# Patient Record
Sex: FEMALE | Race: OTHER | NOT HISPANIC OR LATINO | URBAN - METROPOLITAN AREA
[De-identification: names, ages, dates, MRNs, and addresses within clinical notes are randomized per-mention and may not be internally consistent; named-entity substitution may affect disease eponyms.]

---

## 2021-10-27 ENCOUNTER — INPATIENT (INPATIENT)
Facility: HOSPITAL | Age: 26
LOS: 1 days | Discharge: HOME | End: 2021-10-29
Attending: OBSTETRICS & GYNECOLOGY | Admitting: OBSTETRICS & GYNECOLOGY
Payer: MEDICAID

## 2021-10-27 VITALS
SYSTOLIC BLOOD PRESSURE: 115 MMHG | DIASTOLIC BLOOD PRESSURE: 74 MMHG | HEART RATE: 118 BPM | HEIGHT: 60 IN | WEIGHT: 147.05 LBS | RESPIRATION RATE: 20 BRPM | TEMPERATURE: 99 F

## 2021-10-27 DIAGNOSIS — Z98.890 OTHER SPECIFIED POSTPROCEDURAL STATES: Chronic | ICD-10-CM

## 2021-10-27 LAB
AMPHET UR-MCNC: NEGATIVE — SIGNIFICANT CHANGE UP
APPEARANCE UR: CLEAR — SIGNIFICANT CHANGE UP
BACTERIA # UR AUTO: NEGATIVE — SIGNIFICANT CHANGE UP
BARBITURATES UR SCN-MCNC: NEGATIVE — SIGNIFICANT CHANGE UP
BASOPHILS # BLD AUTO: 0.05 K/UL — SIGNIFICANT CHANGE UP (ref 0–0.2)
BASOPHILS NFR BLD AUTO: 0.2 % — SIGNIFICANT CHANGE UP (ref 0–1)
BENZODIAZ UR-MCNC: NEGATIVE — SIGNIFICANT CHANGE UP
BILIRUB UR-MCNC: NEGATIVE — SIGNIFICANT CHANGE UP
BLD GP AB SCN SERPL QL: SIGNIFICANT CHANGE UP
BUPRENORPHINE SCREEN, URINE RESULT: NEGATIVE — SIGNIFICANT CHANGE UP
COCAINE METAB.OTHER UR-MCNC: NEGATIVE — SIGNIFICANT CHANGE UP
COLOR SPEC: YELLOW — SIGNIFICANT CHANGE UP
DIFF PNL FLD: NEGATIVE — SIGNIFICANT CHANGE UP
EOSINOPHIL # BLD AUTO: 0.1 K/UL — SIGNIFICANT CHANGE UP (ref 0–0.7)
EOSINOPHIL NFR BLD AUTO: 0.5 % — SIGNIFICANT CHANGE UP (ref 0–8)
EPI CELLS # UR: 1 /HPF — SIGNIFICANT CHANGE UP (ref 0–5)
FENTANYL UR QL: NEGATIVE — SIGNIFICANT CHANGE UP
GLUCOSE UR QL: NEGATIVE — SIGNIFICANT CHANGE UP
HCT VFR BLD CALC: 42.7 % — SIGNIFICANT CHANGE UP (ref 37–47)
HGB BLD-MCNC: 14.5 G/DL — SIGNIFICANT CHANGE UP (ref 12–16)
HIV 1 & 2 AB SERPL IA.RAPID: SIGNIFICANT CHANGE UP
HYALINE CASTS # UR AUTO: 1 /LPF — SIGNIFICANT CHANGE UP (ref 0–7)
IMM GRANULOCYTES NFR BLD AUTO: 0.9 % — HIGH (ref 0.1–0.3)
KETONES UR-MCNC: ABNORMAL
L&D DRUG SCREEN, URINE: SIGNIFICANT CHANGE UP
LEUKOCYTE ESTERASE UR-ACNC: NEGATIVE — SIGNIFICANT CHANGE UP
LYMPHOCYTES # BLD AUTO: 1.64 K/UL — SIGNIFICANT CHANGE UP (ref 1.2–3.4)
LYMPHOCYTES # BLD AUTO: 7.8 % — LOW (ref 20.5–51.1)
MCHC RBC-ENTMCNC: 32.7 PG — HIGH (ref 27–31)
MCHC RBC-ENTMCNC: 34 G/DL — SIGNIFICANT CHANGE UP (ref 32–37)
MCV RBC AUTO: 96.4 FL — SIGNIFICANT CHANGE UP (ref 81–99)
METHADONE UR-MCNC: NEGATIVE — SIGNIFICANT CHANGE UP
MONOCYTES # BLD AUTO: 1.47 K/UL — HIGH (ref 0.1–0.6)
MONOCYTES NFR BLD AUTO: 7 % — SIGNIFICANT CHANGE UP (ref 1.7–9.3)
NEUTROPHILS # BLD AUTO: 17.63 K/UL — HIGH (ref 1.4–6.5)
NEUTROPHILS NFR BLD AUTO: 83.6 % — HIGH (ref 42.2–75.2)
NITRITE UR-MCNC: NEGATIVE — SIGNIFICANT CHANGE UP
NRBC # BLD: 0 /100 WBCS — SIGNIFICANT CHANGE UP (ref 0–0)
OPIATES UR-MCNC: NEGATIVE — SIGNIFICANT CHANGE UP
OXYCODONE UR-MCNC: NEGATIVE — SIGNIFICANT CHANGE UP
PCP UR-MCNC: NEGATIVE — SIGNIFICANT CHANGE UP
PH UR: 6.5 — SIGNIFICANT CHANGE UP (ref 5–8)
PLATELET # BLD AUTO: 212 K/UL — SIGNIFICANT CHANGE UP (ref 130–400)
PRENATAL SYPHILIS TEST: SIGNIFICANT CHANGE UP
PROPOXYPHENE QUALITATIVE URINE RESULT: NEGATIVE — SIGNIFICANT CHANGE UP
PROT UR-MCNC: ABNORMAL
RBC # BLD: 4.43 M/UL — SIGNIFICANT CHANGE UP (ref 4.2–5.4)
RBC # FLD: 13.4 % — SIGNIFICANT CHANGE UP (ref 11.5–14.5)
RBC CASTS # UR COMP ASSIST: 4 /HPF — SIGNIFICANT CHANGE UP (ref 0–4)
SARS-COV-2 RNA SPEC QL NAA+PROBE: SIGNIFICANT CHANGE UP
SP GR SPEC: 1.03 — SIGNIFICANT CHANGE UP (ref 1.01–1.03)
UROBILINOGEN FLD QL: SIGNIFICANT CHANGE UP
WBC # BLD: 21.08 K/UL — HIGH (ref 4.8–10.8)
WBC # FLD AUTO: 21.08 K/UL — HIGH (ref 4.8–10.8)
WBC UR QL: 2 /HPF — SIGNIFICANT CHANGE UP (ref 0–5)

## 2021-10-27 RX ORDER — IBUPROFEN 200 MG
600 TABLET ORAL EVERY 6 HOURS
Refills: 0 | Status: DISCONTINUED | OUTPATIENT
Start: 2021-10-27 | End: 2021-10-29

## 2021-10-27 RX ORDER — BENZOCAINE 10 %
1 GEL (GRAM) MUCOUS MEMBRANE EVERY 6 HOURS
Refills: 0 | Status: DISCONTINUED | OUTPATIENT
Start: 2021-10-27 | End: 2021-10-29

## 2021-10-27 RX ORDER — TETANUS TOXOID, REDUCED DIPHTHERIA TOXOID AND ACELLULAR PERTUSSIS VACCINE, ADSORBED 5; 2.5; 8; 8; 2.5 [IU]/.5ML; [IU]/.5ML; UG/.5ML; UG/.5ML; UG/.5ML
0.5 SUSPENSION INTRAMUSCULAR ONCE
Refills: 0 | Status: DISCONTINUED | OUTPATIENT
Start: 2021-10-27 | End: 2021-10-29

## 2021-10-27 RX ORDER — IBUPROFEN 200 MG
600 TABLET ORAL EVERY 6 HOURS
Refills: 0 | Status: COMPLETED | OUTPATIENT
Start: 2021-10-27 | End: 2022-09-25

## 2021-10-27 RX ORDER — FENTANYL/BUPIVACAINE/NS/PF 2MCG/ML-.1
250 PLASTIC BAG, INJECTION (ML) INJECTION
Refills: 0 | Status: DISCONTINUED | OUTPATIENT
Start: 2021-10-27 | End: 2021-10-27

## 2021-10-27 RX ORDER — ONDANSETRON 8 MG/1
4 TABLET, FILM COATED ORAL EVERY 6 HOURS
Refills: 0 | Status: DISCONTINUED | OUTPATIENT
Start: 2021-10-27 | End: 2021-10-27

## 2021-10-27 RX ORDER — PRAMOXINE HYDROCHLORIDE 150 MG/15G
1 AEROSOL, FOAM RECTAL EVERY 4 HOURS
Refills: 0 | Status: DISCONTINUED | OUTPATIENT
Start: 2021-10-27 | End: 2021-10-29

## 2021-10-27 RX ORDER — OXYTOCIN 10 UNIT/ML
333.33 VIAL (ML) INJECTION
Qty: 20 | Refills: 0 | Status: DISCONTINUED | OUTPATIENT
Start: 2021-10-27 | End: 2021-10-29

## 2021-10-27 RX ORDER — DEXAMETHASONE 0.5 MG/5ML
4 ELIXIR ORAL EVERY 6 HOURS
Refills: 0 | Status: DISCONTINUED | OUTPATIENT
Start: 2021-10-27 | End: 2021-10-27

## 2021-10-27 RX ORDER — SODIUM CHLORIDE 9 MG/ML
1000 INJECTION, SOLUTION INTRAVENOUS
Refills: 0 | Status: DISCONTINUED | OUTPATIENT
Start: 2021-10-27 | End: 2021-10-27

## 2021-10-27 RX ORDER — ACETAMINOPHEN 500 MG
975 TABLET ORAL
Refills: 0 | Status: DISCONTINUED | OUTPATIENT
Start: 2021-10-27 | End: 2021-10-29

## 2021-10-27 RX ORDER — NALOXONE HYDROCHLORIDE 4 MG/.1ML
0.1 SPRAY NASAL
Refills: 0 | Status: DISCONTINUED | OUTPATIENT
Start: 2021-10-27 | End: 2021-10-27

## 2021-10-27 RX ORDER — MAGNESIUM HYDROXIDE 400 MG/1
30 TABLET, CHEWABLE ORAL
Refills: 0 | Status: DISCONTINUED | OUTPATIENT
Start: 2021-10-27 | End: 2021-10-29

## 2021-10-27 RX ORDER — LANOLIN
1 OINTMENT (GRAM) TOPICAL EVERY 6 HOURS
Refills: 0 | Status: DISCONTINUED | OUTPATIENT
Start: 2021-10-27 | End: 2021-10-29

## 2021-10-27 RX ORDER — OXYTOCIN 10 UNIT/ML
333.33 VIAL (ML) INJECTION
Qty: 20 | Refills: 0 | Status: DISCONTINUED | OUTPATIENT
Start: 2021-10-27 | End: 2021-10-27

## 2021-10-27 RX ORDER — AER TRAVELER 0.5 G/1
1 SOLUTION RECTAL; TOPICAL EVERY 4 HOURS
Refills: 0 | Status: DISCONTINUED | OUTPATIENT
Start: 2021-10-27 | End: 2021-10-29

## 2021-10-27 RX ORDER — HYDROCORTISONE 1 %
1 OINTMENT (GRAM) TOPICAL EVERY 6 HOURS
Refills: 0 | Status: DISCONTINUED | OUTPATIENT
Start: 2021-10-27 | End: 2021-10-29

## 2021-10-27 RX ORDER — SODIUM CHLORIDE 9 MG/ML
3 INJECTION INTRAMUSCULAR; INTRAVENOUS; SUBCUTANEOUS EVERY 8 HOURS
Refills: 0 | Status: DISCONTINUED | OUTPATIENT
Start: 2021-10-27 | End: 2021-10-29

## 2021-10-27 RX ORDER — OXYTOCIN 10 UNIT/ML
2 VIAL (ML) INJECTION
Qty: 30 | Refills: 0 | Status: DISCONTINUED | OUTPATIENT
Start: 2021-10-27 | End: 2021-10-27

## 2021-10-27 RX ORDER — OXYCODONE HYDROCHLORIDE 5 MG/1
5 TABLET ORAL ONCE
Refills: 0 | Status: DISCONTINUED | OUTPATIENT
Start: 2021-10-27 | End: 2021-10-29

## 2021-10-27 RX ORDER — DIBUCAINE 1 %
1 OINTMENT (GRAM) RECTAL EVERY 6 HOURS
Refills: 0 | Status: DISCONTINUED | OUTPATIENT
Start: 2021-10-27 | End: 2021-10-29

## 2021-10-27 RX ORDER — SIMETHICONE 80 MG/1
80 TABLET, CHEWABLE ORAL EVERY 4 HOURS
Refills: 0 | Status: DISCONTINUED | OUTPATIENT
Start: 2021-10-27 | End: 2021-10-29

## 2021-10-27 RX ORDER — KETOROLAC TROMETHAMINE 30 MG/ML
30 SYRINGE (ML) INJECTION ONCE
Refills: 0 | Status: DISCONTINUED | OUTPATIENT
Start: 2021-10-27 | End: 2021-10-27

## 2021-10-27 RX ORDER — OXYCODONE HYDROCHLORIDE 5 MG/1
5 TABLET ORAL
Refills: 0 | Status: DISCONTINUED | OUTPATIENT
Start: 2021-10-27 | End: 2021-10-29

## 2021-10-27 RX ORDER — CITRIC ACID/SODIUM CITRATE 300-500 MG
15 SOLUTION, ORAL ORAL EVERY 6 HOURS
Refills: 0 | Status: DISCONTINUED | OUTPATIENT
Start: 2021-10-27 | End: 2021-10-27

## 2021-10-27 RX ORDER — DIPHENHYDRAMINE HCL 50 MG
25 CAPSULE ORAL EVERY 6 HOURS
Refills: 0 | Status: DISCONTINUED | OUTPATIENT
Start: 2021-10-27 | End: 2021-10-29

## 2021-10-27 RX ADMIN — SODIUM CHLORIDE 3 MILLILITER(S): 9 INJECTION INTRAMUSCULAR; INTRAVENOUS; SUBCUTANEOUS at 23:22

## 2021-10-27 RX ADMIN — SODIUM CHLORIDE 125 MILLILITER(S): 9 INJECTION, SOLUTION INTRAVENOUS at 18:10

## 2021-10-27 RX ADMIN — Medication 1000 MILLIUNIT(S)/MIN: at 20:53

## 2021-10-27 RX ADMIN — Medication 30 MILLIGRAM(S): at 20:53

## 2021-10-27 NOTE — CHART NOTE - NSCHARTNOTEFT_GEN_A_CORE
patient seen at bedside for recurrent late decelerations. Patient repositioned from left lateral position to right lateral position. Patient was also given a 500cc bolus. Patient has no complaints at this time. Will continue to monitor.    Vital Signs Last 24 Hrs  T(C): 37.2 (27 Oct 2021 15:22), Max: 37.4 (27 Oct 2021 13:42)  T(F): 98.96 (27 Oct 2021 15:22), Max: 99.32 (27 Oct 2021 13:42)  HR: 117 (27 Oct 2021 17:18) (86 - 121)  BP: 93/54 (27 Oct 2021 17:03) (87/54 - 126/59)  RR: 20 (27 Oct 2021 12:05) (20 - 20)  SpO2: 97% (27 Oct 2021 14:33) (97% - 97%)    Dr. Hunter aware. Dr. Mckee to be made aware.

## 2021-10-27 NOTE — OB RN DELIVERY SUMMARY - NSSELHIDDEN_OBGYN_ALL_OB_FT
[NS_DeliveryAttending1_OBGYN_ALL_OB_FT:[id number]=],[NS_DeliveryAssist1_OBGYN_ALL_OB_FT:[id number]=]

## 2021-10-27 NOTE — PROGRESS NOTE ADULT - ASSESSMENT
26y  at 40w, GBS unknown, in labor.    -pain management prn, epidural in place  -cont efm/toco  -f/u pending labs  -cont to monitor vitals  -cont iv hydration      Will make Dr. Hunter and Dr. Mckee to be made aware.

## 2021-10-27 NOTE — OB PROVIDER H&P - HISTORY OF PRESENT ILLNESS
27yo  at 40w0d GA EDC 10/27/21 patient unsure how due date was given. Patient states she had leakage of fluid since 930 this morning clear with contractions every 3minutes. Denies VB. Good fetal movement. Patient was a late transfer, started receiving prenatal care in [place] at 34 weeks. Patient is Rh negative did not receive any rhogam. GBS neg

## 2021-10-27 NOTE — OB PROVIDER DELIVERY SUMMARY - NSPROVIDERDELIVERYNOTE_OBGYN_ALL_OB_FT
Patient was fully dilated and pushing. Fetal head was OA and restituted to ROT. The anterior and posterior shoulders delivered, followed by the remaining body atraumatically. The  was handed to the mother and to the pediatric team. Delayed cord clamping was performed, and then clamped and cut. Cord blood gases collected x2. The placenta delivered intact with membranes. Pitocin was administered. Uterus massaged, fundus found to be firm. Cervix, vagina and perineum inspected 2nd degree and 1st degree laceration was noted, repaired using 3.0 chromic in the usual fashion with good hemostasis.     Viable male infant delivered, with a short cord.       present for the delivery

## 2021-10-27 NOTE — PROCEDURE NOTE - NSINFORMCONSENT_GEN_A_CORE
Hasham/Benefits, risks, and possible complications of procedure explained to patient/caregiver who verbalized understanding and gave written consent.

## 2021-10-27 NOTE — OB PROVIDER H&P - ASSESSMENT
27yo  at 40w0d GA, Rh neg, no rhogam, GBS neg, SROM 10/27 @930,   -admit to L&D  -continuous EFM and toco  -vitals and labs  -pain management PRN  -IVF hydration  -f/u covid  -f/u prenatal labs       Dr. Mckee and Dr. Hunter to be made aware

## 2021-10-27 NOTE — OB PROVIDER DELIVERY SUMMARY - NSSELHIDDEN_OBGYN_ALL_OB_FT
[NS_DeliveryAttending1_OBGYN_ALL_OB_FT:[id number]=],[NS_DeliveryAssist1_OBGYN_ALL_OB_FT:[id number]=]

## 2021-10-27 NOTE — CHART NOTE - NSCHARTNOTEFT_GEN_A_CORE
patient evaluated at bedside. Found to be 8/90/0. Repositioned to high fowlers. Will continue to monitor.

## 2021-10-27 NOTE — OB PROVIDER H&P - NSHPPHYSICALEXAM_GEN_ALL_CORE
T(C): 37 (10-27-21 @ 12:05), Max: 37 (10-27-21 @ 12:05)  HR: 118 (10-27-21 @ 12:05) (118 - 118)  BP: 115/74 (10-27-21 @ 12:05) (115/74 - 115/74)  RR: 20 (10-27-21 @ 12:05) (20 - 20)  BMI (kg/m2): 28.7 (10-27-21 @ 12:05)    Gen: A+OX3. NAD  Abd: Soft, Nontender. Gravid. palpable contractions  FHR: 155bpm/moderate variability/no accelerations/ occaisional variable decelerations  TOCO: q3mins  SVE: 3/90/0 vtx grossly ruptured    EFW by Leopolds: 3600gms

## 2021-10-28 LAB
BASOPHILS # BLD AUTO: 0.04 K/UL — SIGNIFICANT CHANGE UP (ref 0–0.2)
BASOPHILS NFR BLD AUTO: 0.2 % — SIGNIFICANT CHANGE UP (ref 0–1)
EOSINOPHIL # BLD AUTO: 0.11 K/UL — SIGNIFICANT CHANGE UP (ref 0–0.7)
EOSINOPHIL NFR BLD AUTO: 0.4 % — SIGNIFICANT CHANGE UP (ref 0–8)
HBV SURFACE AG SERPL QL IA: SIGNIFICANT CHANGE UP
HCT VFR BLD CALC: 33.9 % — LOW (ref 37–47)
HGB BLD-MCNC: 11.6 G/DL — LOW (ref 12–16)
IMM GRANULOCYTES NFR BLD AUTO: 1.1 % — HIGH (ref 0.1–0.3)
LYMPHOCYTES # BLD AUTO: 2.31 K/UL — SIGNIFICANT CHANGE UP (ref 1.2–3.4)
LYMPHOCYTES # BLD AUTO: 9.4 % — LOW (ref 20.5–51.1)
MCHC RBC-ENTMCNC: 33.6 PG — HIGH (ref 27–31)
MCHC RBC-ENTMCNC: 34.2 G/DL — SIGNIFICANT CHANGE UP (ref 32–37)
MCV RBC AUTO: 98.3 FL — SIGNIFICANT CHANGE UP (ref 81–99)
MONOCYTES # BLD AUTO: 1.75 K/UL — HIGH (ref 0.1–0.6)
MONOCYTES NFR BLD AUTO: 7.1 % — SIGNIFICANT CHANGE UP (ref 1.7–9.3)
NEUTROPHILS # BLD AUTO: 20.18 K/UL — HIGH (ref 1.4–6.5)
NEUTROPHILS NFR BLD AUTO: 81.8 % — HIGH (ref 42.2–75.2)
NRBC # BLD: 0 /100 WBCS — SIGNIFICANT CHANGE UP (ref 0–0)
PLATELET # BLD AUTO: 200 K/UL — SIGNIFICANT CHANGE UP (ref 130–400)
RBC # BLD: 3.45 M/UL — LOW (ref 4.2–5.4)
RBC # FLD: 14 % — SIGNIFICANT CHANGE UP (ref 11.5–14.5)
RUBV IGG SER-ACNC: 14.4 INDEX — SIGNIFICANT CHANGE UP
RUBV IGG SER-IMP: POSITIVE — SIGNIFICANT CHANGE UP
WBC # BLD: 24.66 K/UL — HIGH (ref 4.8–10.8)
WBC # FLD AUTO: 24.66 K/UL — HIGH (ref 4.8–10.8)

## 2021-10-28 PROCEDURE — 99231 SBSQ HOSP IP/OBS SF/LOW 25: CPT

## 2021-10-28 PROCEDURE — 93010 ELECTROCARDIOGRAM REPORT: CPT

## 2021-10-28 RX ADMIN — SODIUM CHLORIDE 3 MILLILITER(S): 9 INJECTION INTRAMUSCULAR; INTRAVENOUS; SUBCUTANEOUS at 06:01

## 2021-10-28 RX ADMIN — SODIUM CHLORIDE 3 MILLILITER(S): 9 INJECTION INTRAMUSCULAR; INTRAVENOUS; SUBCUTANEOUS at 22:24

## 2021-10-28 RX ADMIN — Medication 600 MILLIGRAM(S): at 06:03

## 2021-10-28 RX ADMIN — Medication 600 MILLIGRAM(S): at 06:36

## 2021-10-28 RX ADMIN — SODIUM CHLORIDE 3 MILLILITER(S): 9 INJECTION INTRAMUSCULAR; INTRAVENOUS; SUBCUTANEOUS at 15:23

## 2021-10-28 RX ADMIN — Medication 1 TABLET(S): at 11:50

## 2021-10-28 NOTE — PROGRESS NOTE ADULT - ASSESSMENT
26y P1 S/P  PPD 1, s/p RML episiotomy, recovering well.    - encourage ambulation  - PO hydration  - Continue diet as tolerated   - Monitor vitals and bleeding  - f/u AM CBC   - dermoplast, dubucaine cream and ice packs for pain  - motrin/tylenol PRN  - anticipate d/c home tomorrow and is instructed to follow up in 6 weeks.     Dr. Hunter and Dr. Mckee to be made aware.

## 2021-10-28 NOTE — DISCHARGE NOTE OB - CARE PLAN
Assessment and plan of treatment:	please follow up with your doctor in 6 weeks   1 Principal Discharge DX:	Vaginal delivery  Assessment and plan of treatment:	please follow up with your doctor in 6 weeks

## 2021-10-28 NOTE — DISCHARGE NOTE OB - CARE PROVIDER_API CALL
Josué Mckee  Obstetrics & Gynecology  [address] [place] [address]  Phone: [phone]  Fax: [phone]  Follow Up Time:

## 2021-10-28 NOTE — DISCHARGE NOTE OB - PATIENT PORTAL LINK FT
You can access the FollowMyHealth Patient Portal offered by [facility] by registering at the following website: [email or web address]. By joining [employer]’s FollowMyHealth portal, you will also be able to view your health information using other applications (apps) compatible with our system.

## 2021-10-28 NOTE — DISCHARGE NOTE OB - CARE PROVIDERS DIRECT ADDRESSES
[email or web address]

## 2021-10-29 VITALS
TEMPERATURE: 96 F | RESPIRATION RATE: 18 BRPM | HEART RATE: 101 BPM | DIASTOLIC BLOOD PRESSURE: 54 MMHG | SYSTOLIC BLOOD PRESSURE: 100 MMHG

## 2021-10-29 LAB
BASOPHILS # BLD AUTO: 0.07 K/UL — SIGNIFICANT CHANGE UP (ref 0–0.2)
BASOPHILS NFR BLD AUTO: 0.3 % — SIGNIFICANT CHANGE UP (ref 0–1)
EOSINOPHIL # BLD AUTO: 0.23 K/UL — SIGNIFICANT CHANGE UP (ref 0–0.7)
EOSINOPHIL NFR BLD AUTO: 1 % — SIGNIFICANT CHANGE UP (ref 0–8)
HCT VFR BLD CALC: 34.3 % — LOW (ref 37–47)
HGB BLD-MCNC: 11.4 G/DL — LOW (ref 12–16)
IMM GRANULOCYTES NFR BLD AUTO: 1 % — HIGH (ref 0.1–0.3)
LYMPHOCYTES # BLD AUTO: 11.2 % — LOW (ref 20.5–51.1)
LYMPHOCYTES # BLD AUTO: 2.51 K/UL — SIGNIFICANT CHANGE UP (ref 1.2–3.4)
MCHC RBC-ENTMCNC: 33.1 PG — HIGH (ref 27–31)
MCHC RBC-ENTMCNC: 33.2 G/DL — SIGNIFICANT CHANGE UP (ref 32–37)
MCV RBC AUTO: 99.7 FL — HIGH (ref 81–99)
MONOCYTES # BLD AUTO: 1.43 K/UL — HIGH (ref 0.1–0.6)
MONOCYTES NFR BLD AUTO: 6.4 % — SIGNIFICANT CHANGE UP (ref 1.7–9.3)
NEUTROPHILS # BLD AUTO: 17.97 K/UL — HIGH (ref 1.4–6.5)
NEUTROPHILS NFR BLD AUTO: 80.1 % — HIGH (ref 42.2–75.2)
NRBC # BLD: 0 /100 WBCS — SIGNIFICANT CHANGE UP (ref 0–0)
PLATELET # BLD AUTO: 206 K/UL — SIGNIFICANT CHANGE UP (ref 130–400)
RBC # BLD: 3.44 M/UL — LOW (ref 4.2–5.4)
RBC # FLD: 13.8 % — SIGNIFICANT CHANGE UP (ref 11.5–14.5)
WBC # BLD: 22.43 K/UL — HIGH (ref 4.8–10.8)
WBC # FLD AUTO: 22.43 K/UL — HIGH (ref 4.8–10.8)

## 2021-10-29 PROCEDURE — 99238 HOSP IP/OBS DSCHRG MGMT 30/<: CPT

## 2021-10-29 RX ORDER — IBUPROFEN 200 MG
1 TABLET ORAL
Qty: 0 | Refills: 0 | DISCHARGE
Start: 2021-10-29

## 2021-10-29 RX ORDER — ACETAMINOPHEN 500 MG
3 TABLET ORAL
Qty: 0 | Refills: 0 | DISCHARGE
Start: 2021-10-29

## 2021-10-29 RX ADMIN — Medication 600 MILLIGRAM(S): at 06:18

## 2021-10-29 RX ADMIN — Medication 1 TABLET(S): at 11:18

## 2021-10-29 NOTE — PROGRESS NOTE ADULT - SUBJECTIVE AND OBJECTIVE BOX
Chief Complaint: Postpartum    HPI: Pt doing well, pain well controlled. No overnight events, no acute complaints. Patient is tolerating PO and ambulating. Denies fevers, chills, SOB, CP, abdominal pain or heavy vaginal bleeding. She is bottle feeding.    ROS: Denies cardiovascular or respiratory symptoms    PAST MEDICAL & SURGICAL HISTORY:  No pertinent past medical history    H/O dilation and curettage        Physical Exam  Vital Signs Last 24 Hrs  T(F): 98.7 (28 Oct 2021 23:50), Max: 98.7 (28 Oct 2021 23:50)  HR: 91 (28 Oct 2021 23:50) (90 - 94)  BP: 98/53 (28 Oct 2021 23:50) (94/48 - 98/53)  RR: 18 (28 Oct 2021 23:50) (18 - 19)    Physical exam:  General - AAOx3, NAD  Heart - S1S2, RRR  Lungs - CTA BL  Abdomen:  - Soft, nontender, nondistended, BS+  - Fundus firm, nontender, below the umbilicus  Pelvis/Vagina - Normal Lochia  Extremities - No calf tenderness, no swelling    Labs:                        11.4   22.43 )-----------( 206      ( 29 Oct 2021 05:09 )             34.3                         11.6   24.66 )-----------( 200      ( 28 Oct 2021 11:55 )             33.9     ABO RH Interpretation: B NEG (10-27-21 @ 13:35)  Antibody Screen: NEG (10-27-21 @ 13:35)    
Chief Complaint: Postpartum    HPI: Pt doing well. No overnight events. Patient is complaining about pain around site of RML episiotomy. Denies swelling, redness or discharge. She is tolerating diet without difficulty. She denies fevers, CP, SOB, abdominal pain or heavy vaginal bleeding.     ROS: Denies cardiovascular or respiratory symptoms    PAST MEDICAL & SURGICAL HISTORY:  No pertinent past medical history    H/O dilation and curettage      Physical Exam  Vital Signs Last 24 Hrs  T(F): 98.4 (27 Oct 2021 22:30), Max: 99.6 (27 Oct 2021 20:30)  HR: 107 (27 Oct 2021 22:30) (86 - 155)  BP: 112/51 (27 Oct 2021 22:30) (87/54 - 126/59)  RR: 18 (27 Oct 2021 22:30) (18 - 20)    Physical exam:  General - AAOx3, NAD  Heart - S1S2, RRR  Lungs - CTA BL  Abdomen:  - Soft, nontender, nondistended, BS+  - Fundus firm, nontender, below the umbilicus  Pelvis/Vagina - Normal Lochia  Extremities - No calf tenderness, no swelling    Labs:                        14.5   21.08 )-----------( 212      ( 27 Oct 2021 13:35 )             42.7     ABO RH Interpretation: B NEG (10-27-21 @ 13:35)  Antibody Screen: NEG (10-27-21 @ 13:35)    Prenatal Syphilis Test: Nonreact (10-27-21 @ 13:35)  
PGY1 Note    Patient seen at bedside for evaluation of labor progression, doing well, no complaints. Denies fevers, chills, SOB, CP, abdominal pain or vaginal bleeding.    T(F): 99.32 (10-27-21 @ 13:42), Max: 99.32 (10-27-21 @ 13:42)  HR: 97 (10-27-21 @ 14:55) (97 - 118)  BP: 103/56 (10-27-21 @ 14:51) (103/56 - 126/59)  RR: 20 (10-27-21 @ 12:05) (20 - 20)  SpO2: 97% (10-27-21 @ 14:33) (97% - 97%)    EFM: 160/mod/accels, early decelerations  TOCO: q1-2  SVE: 5/90/-1, vertex clear      Labs:                        14.5   21.08 )-----------( 212      ( 27 Oct 2021 13:35 )             42.7           Prenatal Syphilis Test: Nonreact (10-27-21 @ 13:35)

## 2021-10-29 NOTE — PROGRESS NOTE ADULT - ASSESSMENT
26y P1 S/P  PPD 2, s/p RML episiotomy, recovering well.    - encourage ambulation  - PO hydration  - Continue diet as tolerated   - Monitor vitals and bleeding  - f/u AM CBC   - dermoplast, dubucaine cream and ice packs for pain  - motrin/tylenol PRN  - EKG NSR  - f/u social work consult  - anticipate d/c home tomorrow and is instructed to follow up in 6 weeks.     Dr. Hunter and Dr. Mckee to be made aware.

## 2021-10-29 NOTE — PROGRESS NOTE ADULT - ATTENDING COMMENTS
Patient seen, case discussed with resident  PPD#2  doing well.  WBC still elevated but downtrending, no signs of infection.   Ok to d/c home, precautions discussed.   F/u for postpartum visit in 6 weeks with Dr Mckee.

## 2021-11-03 DIAGNOSIS — Z3A.40 40 WEEKS GESTATION OF PREGNANCY: ICD-10-CM

## 2021-11-03 DIAGNOSIS — Z20.822 CONTACT WITH AND (SUSPECTED) EXPOSURE TO COVID-19: ICD-10-CM

## 2025-02-10 ENCOUNTER — INPATIENT (INPATIENT)
Facility: HOSPITAL | Age: 30
LOS: 1 days | Discharge: HOME | DRG: 560 | End: 2025-02-12
Attending: OBSTETRICS & GYNECOLOGY | Admitting: OBSTETRICS & GYNECOLOGY
Payer: MEDICAID

## 2025-02-10 VITALS
WEIGHT: 134.04 LBS | HEART RATE: 86 BPM | HEIGHT: 63 IN | RESPIRATION RATE: 18 BRPM | DIASTOLIC BLOOD PRESSURE: 66 MMHG | SYSTOLIC BLOOD PRESSURE: 106 MMHG | TEMPERATURE: 98 F

## 2025-02-10 DIAGNOSIS — O26.893 OTHER SPECIFIED PREGNANCY RELATED CONDITIONS, THIRD TRIMESTER: ICD-10-CM

## 2025-02-10 DIAGNOSIS — Z98.890 OTHER SPECIFIED POSTPROCEDURAL STATES: Chronic | ICD-10-CM

## 2025-02-10 DIAGNOSIS — O26.899 OTHER SPECIFIED PREGNANCY RELATED CONDITIONS, UNSPECIFIED TRIMESTER: ICD-10-CM

## 2025-02-10 LAB
APPEARANCE UR: ABNORMAL
BASOPHILS # BLD AUTO: 0.04 K/UL — SIGNIFICANT CHANGE UP (ref 0–0.2)
BASOPHILS NFR BLD AUTO: 0.3 % — SIGNIFICANT CHANGE UP (ref 0–1)
BILIRUB UR-MCNC: NEGATIVE — SIGNIFICANT CHANGE UP
COLOR SPEC: YELLOW — SIGNIFICANT CHANGE UP
DIFF PNL FLD: ABNORMAL
EOSINOPHIL # BLD AUTO: 0.17 K/UL — SIGNIFICANT CHANGE UP (ref 0–0.7)
EOSINOPHIL NFR BLD AUTO: 1.2 % — SIGNIFICANT CHANGE UP (ref 0–8)
GLUCOSE UR QL: NEGATIVE MG/DL — SIGNIFICANT CHANGE UP
HCT VFR BLD CALC: 41.5 % — SIGNIFICANT CHANGE UP (ref 37–47)
HGB BLD-MCNC: 14.3 G/DL — SIGNIFICANT CHANGE UP (ref 12–16)
IMM GRANULOCYTES NFR BLD AUTO: 1.1 % — HIGH (ref 0.1–0.3)
KETONES UR-MCNC: 15 MG/DL
LEUKOCYTE ESTERASE UR-ACNC: ABNORMAL
LYMPHOCYTES # BLD AUTO: 1.37 K/UL — SIGNIFICANT CHANGE UP (ref 1.2–3.4)
LYMPHOCYTES # BLD AUTO: 9.8 % — LOW (ref 20.5–51.1)
MCHC RBC-ENTMCNC: 33.6 PG — HIGH (ref 27–31)
MCHC RBC-ENTMCNC: 34.5 G/DL — SIGNIFICANT CHANGE UP (ref 32–37)
MCV RBC AUTO: 97.6 FL — SIGNIFICANT CHANGE UP (ref 81–99)
MONOCYTES # BLD AUTO: 0.89 K/UL — HIGH (ref 0.1–0.6)
MONOCYTES NFR BLD AUTO: 6.4 % — SIGNIFICANT CHANGE UP (ref 1.7–9.3)
NEUTROPHILS # BLD AUTO: 11.38 K/UL — HIGH (ref 1.4–6.5)
NEUTROPHILS NFR BLD AUTO: 81.2 % — HIGH (ref 42.2–75.2)
NITRITE UR-MCNC: NEGATIVE — SIGNIFICANT CHANGE UP
NRBC # BLD: 0 /100 WBCS — SIGNIFICANT CHANGE UP (ref 0–0)
NRBC BLD-RTO: 0 /100 WBCS — SIGNIFICANT CHANGE UP (ref 0–0)
PH UR: 6.5 — SIGNIFICANT CHANGE UP (ref 5–8)
PLATELET # BLD AUTO: 175 K/UL — SIGNIFICANT CHANGE UP (ref 130–400)
PMV BLD: 10.6 FL — HIGH (ref 7.4–10.4)
PROT UR-MCNC: 100 MG/DL
RBC # BLD: 4.25 M/UL — SIGNIFICANT CHANGE UP (ref 4.2–5.4)
RBC # FLD: 14.1 % — SIGNIFICANT CHANGE UP (ref 11.5–14.5)
SP GR SPEC: 1.02 — SIGNIFICANT CHANGE UP (ref 1–1.03)
UROBILINOGEN FLD QL: 1 MG/DL — SIGNIFICANT CHANGE UP (ref 0.2–1)
WBC # BLD: 14.01 K/UL — HIGH (ref 4.8–10.8)
WBC # FLD AUTO: 14.01 K/UL — HIGH (ref 4.8–10.8)

## 2025-02-10 PROCEDURE — 81001 URINALYSIS AUTO W/SCOPE: CPT

## 2025-02-10 PROCEDURE — 36415 COLL VENOUS BLD VENIPUNCTURE: CPT

## 2025-02-10 PROCEDURE — 86900 BLOOD TYPING SEROLOGIC ABO: CPT

## 2025-02-10 PROCEDURE — 86592 SYPHILIS TEST NON-TREP QUAL: CPT

## 2025-02-10 PROCEDURE — 85461 HEMOGLOBIN FETAL: CPT

## 2025-02-10 PROCEDURE — 86880 COOMBS TEST DIRECT: CPT

## 2025-02-10 PROCEDURE — 80354 DRUG SCREENING FENTANYL: CPT

## 2025-02-10 PROCEDURE — 86902 BLOOD TYPE ANTIGEN DONOR EA: CPT

## 2025-02-10 PROCEDURE — 86850 RBC ANTIBODY SCREEN: CPT

## 2025-02-10 PROCEDURE — 86922 COMPATIBILITY TEST ANTIGLOB: CPT

## 2025-02-10 PROCEDURE — 85025 COMPLETE CBC W/AUTO DIFF WBC: CPT

## 2025-02-10 PROCEDURE — 86905 BLOOD TYPING RBC ANTIGENS: CPT

## 2025-02-10 PROCEDURE — 86870 RBC ANTIBODY IDENTIFICATION: CPT

## 2025-02-10 PROCEDURE — 80307 DRUG TEST PRSMV CHEM ANLYZR: CPT

## 2025-02-10 PROCEDURE — 59409 OBSTETRICAL CARE: CPT | Mod: U7

## 2025-02-10 PROCEDURE — 86901 BLOOD TYPING SEROLOGIC RH(D): CPT

## 2025-02-10 PROCEDURE — 59050 FETAL MONITOR W/REPORT: CPT

## 2025-02-10 PROCEDURE — 90384 RH IG FULL-DOSE IM: CPT

## 2025-02-10 RX ORDER — BACTERIOSTATIC SODIUM CHLORIDE 0.9 %
3 VIAL (ML) INJECTION EVERY 8 HOURS
Refills: 0 | Status: DISCONTINUED | OUTPATIENT
Start: 2025-02-10 | End: 2025-02-12

## 2025-02-10 RX ORDER — IBUPROFEN 600 MG/1
600 TABLET, FILM COATED ORAL EVERY 6 HOURS
Refills: 0 | Status: COMPLETED | OUTPATIENT
Start: 2025-02-10 | End: 2026-01-09

## 2025-02-10 RX ORDER — KETOROLAC TROMETHAMINE 10 MG
30 TABLET ORAL ONCE
Refills: 0 | Status: DISCONTINUED | OUTPATIENT
Start: 2025-02-10 | End: 2025-02-12

## 2025-02-10 RX ORDER — OXYCODONE HYDROCHLORIDE 30 MG/1
5 TABLET ORAL
Refills: 0 | Status: DISCONTINUED | OUTPATIENT
Start: 2025-02-10 | End: 2025-02-12

## 2025-02-10 RX ORDER — ANTISEPTIC SURGICAL SCRUB 0.04 MG/ML
1 SOLUTION TOPICAL DAILY
Refills: 0 | Status: DISCONTINUED | OUTPATIENT
Start: 2025-02-10 | End: 2025-02-10

## 2025-02-10 RX ORDER — ACETAMINOPHEN 160 MG/5ML
975 SUSPENSION ORAL
Refills: 0 | Status: DISCONTINUED | OUTPATIENT
Start: 2025-02-10 | End: 2025-02-12

## 2025-02-10 RX ORDER — PNV WITH CALCIUM NO.11/IRON/FA 65 MG-1 MG
1 TABLET ORAL DAILY
Refills: 0 | Status: DISCONTINUED | OUTPATIENT
Start: 2025-02-10 | End: 2025-02-12

## 2025-02-10 RX ORDER — MAGNESIUM HYDROXIDE 400 MG/5ML
30 SUSPENSION, ORAL (FINAL DOSE FORM) ORAL
Refills: 0 | Status: DISCONTINUED | OUTPATIENT
Start: 2025-02-10 | End: 2025-02-12

## 2025-02-10 RX ORDER — WITCH HAZEL 86 ML/100ML
1 OIL ORAL; TOPICAL EVERY 4 HOURS
Refills: 0 | Status: DISCONTINUED | OUTPATIENT
Start: 2025-02-10 | End: 2025-02-12

## 2025-02-10 RX ORDER — DIPHENHYDRAMINE HCL 25 MG
25 CAPSULE ORAL EVERY 6 HOURS
Refills: 0 | Status: DISCONTINUED | OUTPATIENT
Start: 2025-02-10 | End: 2025-02-12

## 2025-02-10 RX ORDER — OXYTOCIN/0.9 % SODIUM CHLORIDE 10/500ML
167 PLASTIC BAG, INJECTION (ML) INTRAVENOUS
Qty: 30 | Refills: 0 | Status: DISCONTINUED | OUTPATIENT
Start: 2025-02-10 | End: 2025-02-12

## 2025-02-10 RX ORDER — OXYCODONE HYDROCHLORIDE 30 MG/1
5 TABLET ORAL ONCE
Refills: 0 | Status: DISCONTINUED | OUTPATIENT
Start: 2025-02-10 | End: 2025-02-12

## 2025-02-10 RX ORDER — CLOSTRIDIUM TETANI TOXOID ANTIGEN (FORMALDEHYDE INACTIVATED), CORYNEBACTERIUM DIPHTHERIAE TOXOID ANTIGEN (FORMALDEHYDE INACTIVATED), BORDETELLA PERTUSSIS TOXOID ANTIGEN (GLUTARALDEHYDE INACTIVATED), BORDETELLA PERTUSSIS FILAMENTOUS HEMAGGLUTININ ANTIGEN (FORMALDEHYDE INACTIVATED), BORDETELLA PERTUSSIS PERTACTIN ANTIGEN, AND BORDETELLA PERTUSSIS FIMBRIAE 2/3 ANTIGEN 5; 2; 2.5; 5; 3; 5 [LF]/.5ML; [LF]/.5ML; UG/.5ML; UG/.5ML; UG/.5ML; UG/.5ML
0.5 INJECTION, SUSPENSION INTRAMUSCULAR ONCE
Refills: 0 | Status: DISCONTINUED | OUTPATIENT
Start: 2025-02-10 | End: 2025-02-12

## 2025-02-10 RX ORDER — SODIUM CHLORIDE 9 G/ML
1000 INJECTION, SOLUTION INTRAVENOUS
Refills: 0 | Status: DISCONTINUED | OUTPATIENT
Start: 2025-02-10 | End: 2025-02-10

## 2025-02-10 RX ADMIN — Medication 200 GRAM(S): at 22:47

## 2025-02-10 RX ADMIN — Medication 167 MILLIUNIT(S)/MIN: at 23:30

## 2025-02-10 RX ADMIN — SODIUM CHLORIDE 125 MILLILITER(S): 9 INJECTION, SOLUTION INTRAVENOUS at 22:47

## 2025-02-10 NOTE — OB PROVIDER H&P - HISTORY OF PRESENT ILLNESS
No
28yo , at 38w3d, RAPHAEL 25, dated by LMP, presents to L&D for leakage of fluids at 21:30 and ctx since 1500. Pt denies vb. Pt reports good fetal movement. GBS positive.    Pregnancy c/b:  rh neg  -rhogam on 25 (late transfer)    #Late transfer  -[place]     #Elevated GCT  -nl GTT

## 2025-02-10 NOTE — OB PROVIDER H&P - NSHPLABSRESULTS_GEN_ALL_CORE
1/22  B neg, neg antibodies  RPR NR  Hep B NR  Rubella immune  varicella immune  measles immune  mumps immune  HIV NR  Hep C NR  quant neg  1hr   rhogam administered    1/27  3hr GTT 84, 158, 153, 95    1/28  GBS pos      sonos:  36w0d:MVP 5.08cm, nl anatomy, EFW 2626gms

## 2025-02-10 NOTE — OB RN DELIVERY SUMMARY - NSSELHIDDEN_OBGYN_ALL_OB_FT
[NS_DeliveryAttending1_OBGYN_ALL_OB_FT:[id number]],[NS_DeliveryAssist1_OBGYN_ALL_OB_FT:[id number]=],[NS_DeliveryRN_OBGYN_ALL_OB_FT:[id number]=]

## 2025-02-10 NOTE — OB PROVIDER H&P - NSLOWPPHRISK_OBGYN_A_OB
No previous uterine incision/[place] Pregnancy/Less than or equal to 4 previous vaginal births/No known bleeding disorder/No history of postpartum hemorrhage

## 2025-02-10 NOTE — OB PROVIDER DELIVERY SUMMARY - NSPROVIDERDELIVERYNOTE_OBGYN_ALL_OB_FT
Patient was fully dilated and pushing. Fetal head was OP and restituted to ROT. The anterior and posterior shoulders delivered, followed by the remaining body atraumatically. Delayed cord clamping was performed, and then clamped and cut. Cord blood gases collected x2. The  was handed to the mother and RN. The placenta delivered intact with membranes. Pitocin was administered. Uterus massaged, fundus found to be firm. Cervix, vagina and perineum inspected 2nd degree laceration was noted, repaired using 2.0 chromic in the usual fashion with good hemostasis.     Viable female infant delivered, weighing 2640gms, with APGARs 8/9      Dr. Quigley present for the delivery Patient was fully dilated and pushing. Fetal head was OP and restituted to ROT. The anterior and posterior shoulders delivered, followed by the remaining body atraumatically. Delayed cord clamping was performed, and then clamped and cut. Cord blood gases collected x2. The  was handed to the mother and RN. The placenta delivered intact with membranes. Pitocin was administered. Uterus massaged, fundus found to be firm. Cervix, vagina and perineum inspected 2nd degree laceration was noted, repaired using 2.0 chromic in the usual fashion with good hemostasis.     Viable female infant delivered, weighing 2640gms, with APGARs 8/9      Dr. Quigley present for the delivery    ATTENDING NOTE  ATRAUMATIC,  UNCOMPLICATED   AGREE W ABOVE

## 2025-02-10 NOTE — OB PROVIDER DELIVERY SUMMARY - NSSELHIDDEN_OBGYN_ALL_OB_FT
[NS_DeliveryAttending1_OBGYN_ALL_OB_FT:[id number]],[NS_DeliveryAssist1_OBGYN_ALL_OB_FT:[id number]=],[NS_DeliveryRN_OBGYN_ALL_OB_FT:[id number]=]

## 2025-02-10 NOTE — OB PROVIDER H&P - NS_ABDFINDING_OBGYN_ALL_OB
First attempt: Called patient and attempted to schedule DASC procedure. Left detailed voicemail with contact info. Patient portal message sent. Will follow up with this patient in three weeks.     Called patient and attempted to schedule DASC procedure. Left detailed voicemail with contact info. Patient portal message sent. Will follow up with this patient in three weeks.     
Soft, nontender

## 2025-02-10 NOTE — OB PROVIDER H&P - NSHPPHYSICALEXAM_GEN_ALL_CORE
Physical Exam  Vital Signs Last 24 Hrs  T(F): 97.7 (10 Feb 2025 22:26), Max: 97.7 (10 Feb 2025 22:26)  HR: 86 (10 Feb 2025 22:34) (86 - 86)  BP: 106/66 (10 Feb 2025 22:34) (106/66 - 106/66)  RR: 18 (10 Feb 2025 22:26) (18 - 18)    Gen: NAD, AOx3  Abdomen: soft, gravid, nontender, no palpable contractions    EFM: 125bpm/mod tray/+accels  [place]: q  2 mins  SVE: 6/90/-1, gross pooling, nitrazine positive     BSS: vertex

## 2025-02-11 PROBLEM — Z78.9 OTHER SPECIFIED HEALTH STATUS: Chronic | Status: ACTIVE | Noted: 2021-10-27

## 2025-02-11 LAB
BASOPHILS # BLD AUTO: 0.04 K/UL — SIGNIFICANT CHANGE UP (ref 0–0.2)
BASOPHILS NFR BLD AUTO: 0.2 % — SIGNIFICANT CHANGE UP (ref 0–1)
EOSINOPHIL # BLD AUTO: 0.14 K/UL — SIGNIFICANT CHANGE UP (ref 0–0.7)
EOSINOPHIL NFR BLD AUTO: 0.9 % — SIGNIFICANT CHANGE UP (ref 0–8)
FETAL SCREEN: SIGNIFICANT CHANGE UP
HCT VFR BLD CALC: 36.3 % — LOW (ref 37–47)
HGB BLD-MCNC: 12.6 G/DL — SIGNIFICANT CHANGE UP (ref 12–16)
IMM GRANULOCYTES NFR BLD AUTO: 1.1 % — HIGH (ref 0.1–0.3)
L&D DRUG SCREEN, URINE: SIGNIFICANT CHANGE UP
LYMPHOCYTES # BLD AUTO: 1.14 K/UL — LOW (ref 1.2–3.4)
LYMPHOCYTES # BLD AUTO: 7.1 % — LOW (ref 20.5–51.1)
MCHC RBC-ENTMCNC: 33.8 PG — HIGH (ref 27–31)
MCHC RBC-ENTMCNC: 34.7 G/DL — SIGNIFICANT CHANGE UP (ref 32–37)
MCV RBC AUTO: 97.3 FL — SIGNIFICANT CHANGE UP (ref 81–99)
MONOCYTES # BLD AUTO: 1.31 K/UL — HIGH (ref 0.1–0.6)
MONOCYTES NFR BLD AUTO: 8.1 % — SIGNIFICANT CHANGE UP (ref 1.7–9.3)
NEUTROPHILS # BLD AUTO: 13.34 K/UL — HIGH (ref 1.4–6.5)
NEUTROPHILS NFR BLD AUTO: 82.6 % — HIGH (ref 42.2–75.2)
NRBC BLD AUTO-RTO: 0 /100 WBCS — SIGNIFICANT CHANGE UP (ref 0–0)
PLATELET # BLD AUTO: 170 K/UL — SIGNIFICANT CHANGE UP (ref 130–400)
PMV BLD: 10.9 FL — HIGH (ref 7.4–10.4)
PRENATAL SYPHILIS TEST: SIGNIFICANT CHANGE UP
RBC # BLD: 3.73 M/UL — LOW (ref 4.2–5.4)
RBC # FLD: 14 % — SIGNIFICANT CHANGE UP (ref 11.5–14.5)
WBC # BLD: 16.14 K/UL — HIGH (ref 4.8–10.8)
WBC # FLD AUTO: 16.14 K/UL — HIGH (ref 4.8–10.8)

## 2025-02-11 PROCEDURE — 86077 PHYS BLOOD BANK SERV XMATCH: CPT

## 2025-02-11 RX ORDER — IBUPROFEN 600 MG/1
600 TABLET, FILM COATED ORAL EVERY 6 HOURS
Refills: 0 | Status: DISCONTINUED | OUTPATIENT
Start: 2025-02-11 | End: 2025-02-12

## 2025-02-11 RX ADMIN — IBUPROFEN 600 MILLIGRAM(S): 600 TABLET, FILM COATED ORAL at 11:24

## 2025-02-11 RX ADMIN — ACETAMINOPHEN 975 MILLIGRAM(S): 160 SUSPENSION ORAL at 16:09

## 2025-02-11 RX ADMIN — Medication 3 MILLILITER(S): at 21:21

## 2025-02-11 RX ADMIN — ACETAMINOPHEN 975 MILLIGRAM(S): 160 SUSPENSION ORAL at 21:27

## 2025-02-11 RX ADMIN — ACETAMINOPHEN 975 MILLIGRAM(S): 160 SUSPENSION ORAL at 03:57

## 2025-02-11 RX ADMIN — Medication 1 TABLET(S): at 11:24

## 2025-02-11 RX ADMIN — Medication 3 MILLILITER(S): at 15:35

## 2025-02-11 RX ADMIN — ACETAMINOPHEN 975 MILLIGRAM(S): 160 SUSPENSION ORAL at 22:00

## 2025-02-11 RX ADMIN — ACETAMINOPHEN 975 MILLIGRAM(S): 160 SUSPENSION ORAL at 04:27

## 2025-02-11 RX ADMIN — ACETAMINOPHEN 975 MILLIGRAM(S): 160 SUSPENSION ORAL at 17:16

## 2025-02-11 RX ADMIN — Medication 3 MILLILITER(S): at 06:16

## 2025-02-11 RX ADMIN — IBUPROFEN 600 MILLIGRAM(S): 600 TABLET, FILM COATED ORAL at 23:15

## 2025-02-11 RX ADMIN — ACETAMINOPHEN 975 MILLIGRAM(S): 160 SUSPENSION ORAL at 10:20

## 2025-02-11 RX ADMIN — ACETAMINOPHEN 975 MILLIGRAM(S): 160 SUSPENSION ORAL at 09:48

## 2025-02-11 NOTE — DISCHARGE NOTE OB - FINANCIAL ASSISTANCE
[facility] provides services at a reduced cost to those who are determined to be eligible through [facility]’s financial assistance program. Information regarding [facility]’s financial assistance program can be found by going to [email or web address] or by calling [phone].

## 2025-02-11 NOTE — OB PROVIDER H&P - NSICDXNOPASTMEDICALHX_GEN_ALL_CORE
The clinical goals for the shift include pt will free from pain    Over the shift, the patient did make progress toward the following goals. Barriers to progression include forgetful. Pt arrived from ED to Rm 319. Oriented to the room and explained the POC. Continue IV NS @125ml/hr. Pt resting quietly in bed. VSS, RA. Bed alarm No c/o any needs and No ABD pain at this time. Continue POC and call light within reach.   <-- Click to add NO pertinent Past Medical History

## 2025-02-11 NOTE — DISCHARGE NOTE OB - CARE PROVIDER_API CALL
Josué Mi  Obstetrics and Gynecology  [address] [place] [id number]  Phone: [phone]  Fax: [phone]  Follow Up Time:

## 2025-02-11 NOTE — DISCHARGE NOTE OB - PLAN OF CARE
POST-OPERATIVE NOTE    Subjective:   19y Female s/p Lap appendectomy POD #0 . Denies nausea, vomiting, chest pain, sob, fevers chills. Pain is well controlled. Ambulating independently. Voiding spontaneously.    Vital Signs Last 24 Hrs  T(C): 37.2 (10 Deshaun 2020 00:29), Max: 37.3 (09 Jun 2020 21:25)  T(F): 99 (10 Deshaun 2020 00:29), Max: 99.1 (09 Jun 2020 21:25)  HR: 82 (10 Deshaun 2020 00:29) (78 - 106)  BP: 109/53 (10 Deshaun 2020 00:29) (101/50 - 134/84)  BP(mean): 84 (09 Jun 2020 15:33) (84 - 97)  RR: 18 (10 Deshaun 2020 00:29) (13 - 20)  SpO2: 100% (10 Deshaun 2020 00:29) (95% - 100%)  I&O's Detail    09 Jun 2020 07:01  -  10 Deshaun 2020 05:52  --------------------------------------------------------  IN:    Lactated Ringers IV Bolus: 1100 mL  Total IN: 1100 mL    OUT:  Total OUT: 0 mL    Total NET: 1100 mL          Physical Exam:  General: NAD, resting comfortably in bed  Pulmonary: Nonlabored breathing, no respiratory distress  Cardiovascular: NSR, S1, S2  Abdominal: soft, NT/ND, dressing c/d/i  Extremities: no edema, no calf tenderness, distal pulses are palpable     LABS:                        10.9   15.82 )-----------( 404      ( 09 Jun 2020 04:18 )             35.2     06-09    137  |  106  |  10  ----------------------------<  94  3.7   |  23  |  0.61    Ca    8.7      09 Jun 2020 04:18    TPro  7.9  /  Alb  3.7  /  TBili  0.3  /  DBili  x   /  AST  12  /  ALT  17  /  AlkPhos  68  06-09    LIVER FUNCTIONS - ( 09 Jun 2020 04:18 )  Alb: 3.7 g/dL / Pro: 7.9 g/dL / ALK PHOS: 68 U/L / ALT: 17 U/L DA / AST: 12 U/L / GGT: x             MEDICATIONS  (STANDING):  ciprofloxacin   IVPB 400 milliGRAM(s) IV Intermittent every 12 hours  ciprofloxacin   IVPB      dextrose 5% + sodium chloride 0.9%. 1000 milliLiter(s) (125 mL/Hr) IV Continuous <Continuous>  heparin   Injectable 5000 Unit(s) SubCutaneous every 12 hours  metroNIDAZOLE  IVPB 500 milliGRAM(s) IV Intermittent every 8 hours    MEDICATIONS  (PRN):  acetaminophen   Tablet .. 650 milliGRAM(s) Oral every 6 hours PRN Temp greater or equal to 38C (100.4F)  morphine  - Injectable 2 milliGRAM(s) IV Push every 6 hours PRN Severe Pain (7 - 10)  ondansetron Injectable 4 milliGRAM(s) IV Push every 6 hours PRN Nausea  oxycodone    5 mG/acetaminophen 325 mG 1 Tablet(s) Oral every 6 hours PRN Moderate Pain (4 - 6)      Assessment:  The patient is a 19y Female who is s/p Lap appendectomy POD #0 Stable    Plan:  - Pain control as needed  -Dressing change prn   - OOB and ambulating as tolerated  - F/u AM labs  - DVT ppx
No heavy lifting.   Nothing inside the vagina for 6 weeks: no tampons, douching, sexual intercourse, tub baths or pools.   If you have a fever over 100.4F, severe abdominal pain or heavy vaginal bleeding please call your doctor or go to the emergency room.  Please follow up with your OBGYN in 6 weeks for a postpartum visit.  You may take tylenol 650mg every 6 hours and iburpfeon 600mg every  6 hours as needed

## 2025-02-11 NOTE — DISCHARGE NOTE OB - PATIENT PORTAL LINK FT
You can access the FollowMyHealth Patient Portal offered by [facility] by registering at the following website: [email or web address]. By joining [employer]’s FollowMyHealth portal, you will also be able to view your health information using other applications (apps) compatible with our system.

## 2025-02-12 VITALS
TEMPERATURE: 98 F | OXYGEN SATURATION: 98 % | DIASTOLIC BLOOD PRESSURE: 60 MMHG | SYSTOLIC BLOOD PRESSURE: 97 MMHG | RESPIRATION RATE: 18 BRPM | HEART RATE: 80 BPM

## 2025-02-12 RX ADMIN — IBUPROFEN 600 MILLIGRAM(S): 600 TABLET, FILM COATED ORAL at 00:02

## 2025-02-12 RX ADMIN — Medication 3 MILLILITER(S): at 06:02

## 2025-02-12 RX ADMIN — IBUPROFEN 600 MILLIGRAM(S): 600 TABLET, FILM COATED ORAL at 06:23

## 2025-02-12 RX ADMIN — Medication 1 TABLET(S): at 13:17

## 2025-02-12 RX ADMIN — IBUPROFEN 600 MILLIGRAM(S): 600 TABLET, FILM COATED ORAL at 12:17

## 2025-02-12 RX ADMIN — ACETAMINOPHEN 975 MILLIGRAM(S): 160 SUSPENSION ORAL at 09:46

## 2025-02-12 RX ADMIN — ACETAMINOPHEN 975 MILLIGRAM(S): 160 SUSPENSION ORAL at 10:16

## 2025-02-12 RX ADMIN — IBUPROFEN 600 MILLIGRAM(S): 600 TABLET, FILM COATED ORAL at 05:56

## 2025-02-12 RX ADMIN — IBUPROFEN 600 MILLIGRAM(S): 600 TABLET, FILM COATED ORAL at 12:47

## 2025-02-12 NOTE — PROGRESS NOTE ADULT - SUBJECTIVE AND OBJECTIVE BOX
PPD#1  afebrile no c/o  breast feeding  afebrile  abd soft   extr neg  hb stable  rh neg got rhogam  d/c home f/u office 4-6 weeks
PPD#0  s/p vaginal delivery  no c/o   nursing with good latch  rh neg for rhogam baby +  considering early d/c tomorrow if no problems

## 2025-02-19 DIAGNOSIS — O26.893 OTHER SPECIFIED PREGNANCY RELATED CONDITIONS, THIRD TRIMESTER: ICD-10-CM

## 2025-02-19 DIAGNOSIS — Z3A.38 38 WEEKS GESTATION OF PREGNANCY: ICD-10-CM

## 2025-02-19 DIAGNOSIS — Z67.91 UNSPECIFIED BLOOD TYPE, RH NEGATIVE: ICD-10-CM

## 2025-07-25 NOTE — PROCEDURAL SAFETY CHECKLIST WITH OR WITHOUT SEDATION - [id number]_GEN_ALL_CORE
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  Diagnosed with Bilateral PE 22 while on ASA 162mg po daily.  She had d/c Lovenox 40mg sq daily s/p Left Knee replacement on 10/6/2022 -1 week prior. + for Covid 10/26/22.   LE doppler 2022 - No evidence of acute or chronic deep vein thrombosis of either LE     History of  Positive LA; history of + anticardiolipin antibody.       Given her history of persistently positive lupus anticoagulant, her pulmonary embolism that occurred while on aspirin 162 milligrams p.o. daily even though in the background of COVID positivity, her risk of recurrent thromboembolic event is high and chronic lifelong Coumadin for antiphospholipid antibody syndrome recommended.  She was agreeable.   on warfarin 5mg all days except 7.5 mg on sat  INR 25- supratherapeutic at 3.51 , held coumadin for 2 days and resumed already  Recheck INR q2 weeks    F/u 4 months with Maile, call sooner for concerns including ab bleeding, dizziness, fatigue extreme  Orders:    Protime-INR; Standing    CBC and differential; Future    B12 deficiency  B12 1000mcg IM monthly.   23    B12 level 328  Frequency Increase. B12 1000mcg IM q 2 weeks      24 B12 505  24 B12 687  25 305  2 additional doses of B12 IM when she received IV iron  25 B12 565    25- 392  Continue IM B12 1000mcg q2wks  Monitor level  Orders:    CBC and differential; Future    Vitamin B12; Future    cyanocobalamin 1,000 mcg/mL; 1000 mcg IM Q 2 weeks    NEEDLE, DISP, 23 G (BD Disp Needle) 23G X 1\" MISC; Use to administer B12 IM    Iron deficiency anemia, unspecified iron deficiency anemia type  2023 colonoscopy- diverticula, " polyps, small hemorrhoids.  She is s/p Nissen Fundoplication for severe GERD      11/2022  Received Feraheme x 2      1/17/25  iron saturation 13%;  ferritin 50  Feraheme x 2 doses      5/23/25 ferritin 213, iron saturation 28%    7/14/25 (post op 6 wks) ferritin 183, iron sat 17%, hgb 12.4, MCV 97    Recheck iron panel and cbc in 3-4 months  Orders:    CBC and differential; Future    Iron Panel (Includes Ferritin, Iron Sat%, Iron, and TIBC); Future    Antiphospholipid antibody syndrome (HCC)  History of  Positive LA; history of + anticardiolipin antibody.       2/2015 positive lupus anticoagulant  7/2016 positive lupus anticoagulant  9/2022 positive lupus anticoagulant  2/2023 no lupus anticoagulant was detected  07/2025 no lupus anticoagulant detected     2/2015 normal beta-2 glycoprotein antibodies  12/20/2018 normal beta-2 glycoprotein antibodies  4/2021 normal beta-2 glycoprotein antibodies  1/2023 normal beta-2 glycoprotein antibodies        2/2015 IgM anticardiolipin antibody 14.5  7/2016  normal anticardiolipin antibodies  10/2017  IgM anticardiolipin antibody 18 (Indeterminate). Normal IgG anticardiolipin antibody , normal IgA anticardiolipin antibody.  2/2018  normal anticardiolipin antibodies  6/2018 normal anticardiolipin antibodies  12/2018 normal anticardiolipin antibodies  4/2019 normal anticardiolipin antibodies  10/2019 normal anticardiolipin antibodies  10/2020 normal anticardiolipin antibodies  4/2021 normal anticardiolipin antibodies  9/2022 normal anticardiolipin antibodies  11/2024 normal anticardiolipin antibodies  07/2025 normal anticardiolipin anitbodies       Age-related osteoporosis without current pathological fracture  06/24 DXA bone scan showing osteoporosis  Saw Rheumatologist Dr. Loredo on 7/3/25- due for repeat DXA scan , searching for new endocrinologist for tx  Continue Vit D3 supplement    Orders:    DXA bone density spine hip and pelvis; Future        Return in about 4 months  "(around 11/25/2025) for Office Visit.    History of Present Illness   Chief Complaint   Patient presents with    Follow-up     Pertinent Medical History     07/25/25: Today reports she is doing well s/p TKA 6 wks ago on 6/11/25. Undergoing PT, pain minimal. Reports she was not told she had significant blood loss with procedure. Taking warfarin as prescribed and held it for 2 days with elevated INR on 7/14/25. Denies extreme fatigue, sob worse from baseline, dizziness, ab bleeding such as nosebleeds. No cp, ab pain.       Review of Systems   Constitutional:  Negative for activity change and fatigue.   Respiratory:  Negative for shortness of breath.    Cardiovascular:  Negative for chest pain.   Gastrointestinal:  Negative for abdominal pain and blood in stool.   Genitourinary:  Negative for hematuria.   Neurological:  Negative for dizziness and light-headedness.     Medications Ordered Prior to Encounter[1]   Social History[2]      Objective   /74 (BP Location: Left arm, Patient Position: Sitting, Cuff Size: Large)   Pulse 97   Temp 97.9 °F (36.6 °C) (Temporal)   Resp 18   Ht 5' 2\" (1.575 m)   SpO2 96%   BMI 42.80 kg/m²     Physical Exam  Vitals reviewed.   Constitutional:       General: She is not in acute distress.     Appearance: Normal appearance.   HENT:      Head: Normocephalic.   Pulmonary:      Effort: No respiratory distress.     Musculoskeletal:      Comments: Incision of L knee s/p L tka revision noted without edema, erythema, exudate     Skin:     General: Skin is warm and dry.      Coloration: Skin is not pale.     Neurological:      General: No focal deficit present.      Mental Status: She is alert and oriented to person, place, and time.     Psychiatric:         Mood and Affect: Mood normal.         Labs: I have reviewed the following labs:  Results for orders placed or performed in visit on 07/14/25   CBC and differential   Result Value Ref Range    WBC 3.08 (L) 4.31 - 10.16 Thousand/uL "    RBC 3.87 3.81 - 5.12 Million/uL    Hemoglobin 12.4 11.5 - 15.4 g/dL    Hematocrit 37.5 34.8 - 46.1 %    MCV 97 82 - 98 fL    MCH 32.0 26.8 - 34.3 pg    MCHC 33.1 31.4 - 37.4 g/dL    RDW 12.6 11.6 - 15.1 %    MPV 10.3 8.9 - 12.7 fL    Platelets 206 149 - 390 Thousands/uL    nRBC 0 /100 WBCs    Segmented % 63 43 - 75 %    Immature Grans % 0 0 - 2 %    Lymphocytes % 14 14 - 44 %    Monocytes % 8 4 - 12 %    Eosinophils Relative 14 (H) 0 - 6 %    Basophils Relative 1 0 - 1 %    Absolute Neutrophils 1.93 1.85 - 7.62 Thousands/µL    Absolute Immature Grans 0.01 0.00 - 0.20 Thousand/uL    Absolute Lymphocytes 0.43 (L) 0.60 - 4.47 Thousands/µL    Absolute Monocytes 0.26 0.17 - 1.22 Thousand/µL    Eosinophils Absolute 0.43 0.00 - 0.61 Thousand/µL    Basophils Absolute 0.02 0.00 - 0.10 Thousands/µL   Vitamin B12   Result Value Ref Range    Vitamin B-12 392 180 - 914 pg/mL   Comprehensive metabolic panel   Result Value Ref Range    Sodium 139 135 - 147 mmol/L    Potassium 3.8 3.5 - 5.3 mmol/L    Chloride 107 96 - 108 mmol/L    CO2 29 21 - 32 mmol/L    ANION GAP 3 (L) 4 - 13 mmol/L    BUN 19 5 - 25 mg/dL    Creatinine 0.84 0.60 - 1.30 mg/dL    Glucose, Fasting 91 65 - 99 mg/dL    Calcium 9.5 8.4 - 10.2 mg/dL    AST 10 (L) 13 - 39 U/L    ALT 9 7 - 52 U/L    Alkaline Phosphatase 112 (H) 34 - 104 U/L    Total Protein 6.1 (L) 6.4 - 8.4 g/dL    Albumin 4.0 3.5 - 5.0 g/dL    Total Bilirubin 0.44 0.20 - 1.00 mg/dL    eGFR 74 ml/min/1.73sq m   Result Value Ref Range    Uric Acid 4.7 2.0 - 7.5 mg/dL   Protime-INR   Result Value Ref Range    Protime 35.7 (H) 12.3 - 15.0 seconds    INR 3.51 (H) 0.85 - 1.19   Protein / creatinine ratio, urine   Result Value Ref Range    Creatinine, Ur 88.4 Reference range not established. mg/dL    Protein Urine Random 6.8 Reference range not established. mg/dL    Prot/Creat Ratio, Ur 0.1 <=0.2   Result Value Ref Range    C3 Complement 157 87 - 200 mg/dL   C4 complement   Result Value Ref Range    C4,  COMPLEMENT 39 19 - 52 mg/dL   Vitamin D 25 hydroxy   Result Value Ref Range    Vit D, 25-Hydroxy 48.7 30.0 - 100.0 ng/mL   Lupus anticoagulant   Result Value Ref Range    PTT Lupus Anticoagulant 62.6 (H) 0.0 - 43.5 sec    Dilute Viper Venom Time 71.6 (H) 0.0 - 47.0 sec    DILUTE PROTHROMBIN TIME(DPT) 126.4 (H) 0.0 - 47.6 sec    THROMBIN TIME (DRVW) 18.8 0.0 - 23.0 sec    DPT CONFIRM RATIO 1.08 0.00 - 1.34 Ratio    LUPUS REFLEX INTERPRETATION Comment:    Cardiolipin antibody   Result Value Ref Range    ANTICARDIOLIPIN IGG ANTIBODY 1.1 See comment    ANTICARDIOLIPIN IGA ANTIBODY 1.0 See comment    ANTICARDIOLIPIN IGM ANTIBODY <0.9 See comment   UA w Reflex to Microscopic w Reflex to Culture    Specimen: Urine   Result Value Ref Range    Color, UA Colorless     Clarity, UA Clear     Specific Gravity, UA 1.009 1.003 - 1.030    pH, UA 5.0 4.5, 5.0, 5.5, 6.0, 6.5, 7.0, 7.5, 8.0    Leukocytes, UA Negative Negative    Nitrite, UA Negative Negative    Protein, UA Negative Negative mg/dl    Glucose, UA Negative Negative mg/dl    Ketones, UA Negative Negative mg/dl    Urobilinogen, UA <2.0 <2.0 mg/dl mg/dl    Bilirubin, UA Negative Negative    Occult Blood, UA Negative Negative   Sedimentation rate, automated   Result Value Ref Range    Sed Rate 14 0 - 29 mm/hour   dsDNA Antibody by IFA, Crithidia luciliae, with Reflex to Titer   Result Value Ref Range    dsDNA Crithidia luciliae IFA Negative Negative   MISCELLANEOUS LAB TEST   Result Value Ref Range    Miscellaneous Lab Test Result SEE WRITTEN REPORT    TIBC Panel (incl. Iron, TIBC, % Iron Saturation)   Result Value Ref Range    Iron Saturation 17 15 - 50 %    TIBC 298.2 250 - 450 ug/dL    Iron 51 50 - 212 ug/dL    Transferrin 213 203 - 362 mg/dL    UIBC 247 155 - 355 ug/dL   Result Value Ref Range    Ferritin 183 30 - 307 ng/mL   PTT-LA Mixing Study   Result Value Ref Range    PTT-LA MIX 45.2 (H) 0.0 - 40.5 sec   Hexagonal Phase Phospholipid   Result Value Ref Range    Hex  Phosph Neut Test 9 0 - 11 sec   DRVVT Mix-LA   Result Value Ref Range    dRVVT Mix Interp. 45.6 (H) 0.0 - 40.4 sec   DRVVT Confirm-LA   Result Value Ref Range    DRVVT/Confirm Ratio 1.0 0.8 - 1.2 ratio     *Note: Due to a large number of results and/or encounters for the requested time period, some results have not been displayed. A complete set of results can be found in Results Review.     Results for orders placed or performed in visit on 07/14/25   CBC and differential   Result Value Ref Range    WBC 3.08 (L) 4.31 - 10.16 Thousand/uL    RBC 3.87 3.81 - 5.12 Million/uL    Hemoglobin 12.4 11.5 - 15.4 g/dL    Hematocrit 37.5 34.8 - 46.1 %    MCV 97 82 - 98 fL    MCH 32.0 26.8 - 34.3 pg    MCHC 33.1 31.4 - 37.4 g/dL    RDW 12.6 11.6 - 15.1 %    MPV 10.3 8.9 - 12.7 fL    Platelets 206 149 - 390 Thousands/uL    nRBC 0 /100 WBCs    Segmented % 63 43 - 75 %    Immature Grans % 0 0 - 2 %    Lymphocytes % 14 14 - 44 %    Monocytes % 8 4 - 12 %    Eosinophils Relative 14 (H) 0 - 6 %    Basophils Relative 1 0 - 1 %    Absolute Neutrophils 1.93 1.85 - 7.62 Thousands/µL    Absolute Immature Grans 0.01 0.00 - 0.20 Thousand/uL    Absolute Lymphocytes 0.43 (L) 0.60 - 4.47 Thousands/µL    Absolute Monocytes 0.26 0.17 - 1.22 Thousand/µL    Eosinophils Absolute 0.43 0.00 - 0.61 Thousand/µL    Basophils Absolute 0.02 0.00 - 0.10 Thousands/µL   Vitamin B12   Result Value Ref Range    Vitamin B-12 392 180 - 914 pg/mL   Comprehensive metabolic panel   Result Value Ref Range    Sodium 139 135 - 147 mmol/L    Potassium 3.8 3.5 - 5.3 mmol/L    Chloride 107 96 - 108 mmol/L    CO2 29 21 - 32 mmol/L    ANION GAP 3 (L) 4 - 13 mmol/L    BUN 19 5 - 25 mg/dL    Creatinine 0.84 0.60 - 1.30 mg/dL    Glucose, Fasting 91 65 - 99 mg/dL    Calcium 9.5 8.4 - 10.2 mg/dL    AST 10 (L) 13 - 39 U/L    ALT 9 7 - 52 U/L    Alkaline Phosphatase 112 (H) 34 - 104 U/L    Total Protein 6.1 (L) 6.4 - 8.4 g/dL    Albumin 4.0 3.5 - 5.0 g/dL    Total Bilirubin 0.44  0.20 - 1.00 mg/dL    eGFR 74 ml/min/1.73sq m   Result Value Ref Range    Uric Acid 4.7 2.0 - 7.5 mg/dL   Protime-INR   Result Value Ref Range    Protime 35.7 (H) 12.3 - 15.0 seconds    INR 3.51 (H) 0.85 - 1.19   Protein / creatinine ratio, urine   Result Value Ref Range    Creatinine, Ur 88.4 Reference range not established. mg/dL    Protein Urine Random 6.8 Reference range not established. mg/dL    Prot/Creat Ratio, Ur 0.1 <=0.2   Result Value Ref Range    C3 Complement 157 87 - 200 mg/dL   C4 complement   Result Value Ref Range    C4, COMPLEMENT 39 19 - 52 mg/dL   Vitamin D 25 hydroxy   Result Value Ref Range    Vit D, 25-Hydroxy 48.7 30.0 - 100.0 ng/mL   Lupus anticoagulant   Result Value Ref Range    PTT Lupus Anticoagulant 62.6 (H) 0.0 - 43.5 sec    Dilute Viper Venom Time 71.6 (H) 0.0 - 47.0 sec    DILUTE PROTHROMBIN TIME(DPT) 126.4 (H) 0.0 - 47.6 sec    THROMBIN TIME (DRVW) 18.8 0.0 - 23.0 sec    DPT CONFIRM RATIO 1.08 0.00 - 1.34 Ratio    LUPUS REFLEX INTERPRETATION Comment:    Cardiolipin antibody   Result Value Ref Range    ANTICARDIOLIPIN IGG ANTIBODY 1.1 See comment    ANTICARDIOLIPIN IGA ANTIBODY 1.0 See comment    ANTICARDIOLIPIN IGM ANTIBODY <0.9 See comment   UA w Reflex to Microscopic w Reflex to Culture    Specimen: Urine   Result Value Ref Range    Color, UA Colorless     Clarity, UA Clear     Specific Gravity, UA 1.009 1.003 - 1.030    pH, UA 5.0 4.5, 5.0, 5.5, 6.0, 6.5, 7.0, 7.5, 8.0    Leukocytes, UA Negative Negative    Nitrite, UA Negative Negative    Protein, UA Negative Negative mg/dl    Glucose, UA Negative Negative mg/dl    Ketones, UA Negative Negative mg/dl    Urobilinogen, UA <2.0 <2.0 mg/dl mg/dl    Bilirubin, UA Negative Negative    Occult Blood, UA Negative Negative   Sedimentation rate, automated   Result Value Ref Range    Sed Rate 14 0 - 29 mm/hour   dsDNA Antibody by IFA, Crithidia luciliae, with Reflex to Titer   Result Value Ref Range    dsDNA Crithidia luciliae IFA Negative  "Negative   MISCELLANEOUS LAB TEST   Result Value Ref Range    Miscellaneous Lab Test Result SEE WRITTEN REPORT    TIBC Panel (incl. Iron, TIBC, % Iron Saturation)   Result Value Ref Range    Iron Saturation 17 15 - 50 %    TIBC 298.2 250 - 450 ug/dL    Iron 51 50 - 212 ug/dL    Transferrin 213 203 - 362 mg/dL    UIBC 247 155 - 355 ug/dL   Result Value Ref Range    Ferritin 183 30 - 307 ng/mL   PTT-LA Mixing Study   Result Value Ref Range    PTT-LA MIX 45.2 (H) 0.0 - 40.5 sec   Hexagonal Phase Phospholipid   Result Value Ref Range    Hex Phosph Neut Test 9 0 - 11 sec   DRVVT Mix-LA   Result Value Ref Range    dRVVT Mix Interp. 45.6 (H) 0.0 - 40.4 sec   DRVVT Confirm-LA   Result Value Ref Range    DRVVT/Confirm Ratio 1.0 0.8 - 1.2 ratio     *Note: Due to a large number of results and/or encounters for the requested time period, some results have not been displayed. A complete set of results can be found in Results Review.        Radiology Results Review : No pertinent imaging studies reviewed.    Administrative Statements   I have spent a total time of 30 minutes in caring for this patient on the day of the visit/encounter including Diagnostic results, Prognosis, Instructions for management, Patient and family education, Impressions, Counseling / Coordination of care, Documenting in the medical record, Reviewing/placing orders in the medical record (including tests, medications, and/or procedures), and Obtaining or reviewing history  .         [1]   Current Outpatient Medications on File Prior to Visit   Medication Sig Dispense Refill    acetaminophen (TYLENOL) 500 mg tablet Take 2 tablets (1,000 mg total) by mouth every 8 (eight) hours 60 tablet 0    ascorbic acid (VITAMIN C) 500 MG tablet Take 1 tablet (500 mg total) by mouth 2 (two) times a day 60 tablet 1    B-D 3CC LUER-LILLIE SYR 25GX1/2\" 25G X 1-1/2\" 3 ML MISC USE 1 SYRINGE EVERY 30 DAYS 12 each 1    Benlysta 200 MG/ML SOAJ Inject under the skin Every friday      " cholecalciferol (VITAMIN D3) 25 mcg (1,000 units) tablet Take 2 tablets (2,000 Units total) by mouth daily 60 tablet 1    clindamycin (CLEOCIN) 300 MG capsule Take 2 capsules by mouth 1 hour prior to dental procedure/cleaning 2 capsule 5    fluconazole (DIFLUCAN) 150 mg tablet if needed (Patient not taking: Reported on 7/11/2025)      folic acid (FOLVITE) 1 mg tablet Take 1 tablet (1 mg total) by mouth daily (Patient not taking: Reported on 6/24/2025) 30 tablet 1    gabapentin (NEURONTIN) 100 mg capsule TAKE 1 CAPSULE(100 MG) BY MOUTH THREE TIMES DAILY 90 capsule 5    hydroxychloroquine (PLAQUENIL) 200 mg tablet 200mg every other day, 400mg alternating days. Changing as needed d/t theraputic level      loperamide (IMODIUM A-D) 2 MG tablet Take 2 mg by mouth 4 (four) times a day as needed for diarrhea      multivitamin (THERAGRAN) TABS Take 1 tablet by mouth daily      nystatin (MYCOSTATIN) powder Apply topically 3 (three) times a day (Patient taking differently: Apply topically if needed) 60 g 0    ondansetron (ZOFRAN-ODT) 4 mg disintegrating tablet Take 1 tablet (4 mg total) by mouth every 6 (six) hours as needed for nausea or vomiting (Patient not taking: Reported on 6/24/2025) 20 tablet 0    potassium chloride (Klor-Con M20) 20 mEq tablet Take 1 tablet (20 mEq total) by mouth daily (Patient taking differently: Take 20 mEq by mouth if needed) 30 tablet 2    senna-docusate sodium (SENOKOT S) 8.6-50 mg per tablet Take 1 tablet by mouth daily (Patient not taking: Reported on 6/24/2025) 30 tablet 0    sildenafil (REVATIO) 20 mg tablet Take 2 tablets (40 mg total) by mouth 3 (three) times a day 180 tablet 5    spironolactone (ALDACTONE) 25 mg tablet TAKE 1 TABLET(25 MG) BY MOUTH DAILY 90 tablet 1    torsemide (DEMADEX) 20 mg tablet TAKE 1 TABLET(20 MG) BY MOUTH DAILY 90 tablet 1    traZODone (DESYREL) 50 mg tablet Take 2 tablets (100 mg total) by mouth daily at bedtime 180 tablet 1    warfarin (Coumadin) 5 mg tablet  "7.5mg po daily or as directed. 150 tablet 3    [DISCONTINUED] cyanocobalamin 1,000 mcg/mL 1000 mcg IM Q 2 weeks 10 mL 3    [DISCONTINUED] ferrous sulfate 324 (65 Fe) mg Take 1 tablet (324 mg total) by mouth 2 (two) times a day before meals 60 tablet 1    [DISCONTINUED] NEEDLE, DISP, 23 G (BD Disp Needle) 23G X 1\" MISC Use to administer B12 10 each 3     No current facility-administered medications on file prior to visit.   [2]   Social History  Tobacco Use    Smoking status: Former     Current packs/day: 0.00     Types: Cigarettes     Quit date: 2006     Years since quittin.5    Smokeless tobacco: Never   Vaping Use    Vaping status: Never Used   Substance and Sexual Activity    Alcohol use: Not Currently     Comment: Socially    Drug use: No    Sexual activity: Yes     Partners: Male     Birth control/protection: Female Sterilization     " not applicable